# Patient Record
(demographics unavailable — no encounter records)

---

## 2024-12-30 NOTE — DISCUSSION/SUMMARY
[de-identified] : I explained to the patient that due to her extensive smoking history she is at increased risk for surgical complications (ie infection, wound healing problems and nonunion).  Surgery for her would not require simply "cutting off the bump" as she requested, but would necessitate possible osteotomies and/or arthrodesis.    Since she has not been to a primary care physician in a very long time as per her, this was strongly recommended. She also has significant weakness in both ankles. Neurology evaluation was also strongly recommended.  I explained that surgery could not be planned until she is seen by a pcp and a neurologist.  She will follow up at that time.

## 2024-12-30 NOTE — PHYSICAL EXAM
[1st] : 1st [3___] : eversion 3[unfilled]/5 [NL (20)] : eversion 20 degrees [5___] : [facility] 5[unfilled]/5 [Mild] : mild swelling of MTP joint/great toe [NL (40)] : plantar flexion 40 degrees [NL 30)] : inversion 30 degrees [4___] : eversion 4[unfilled]/5 [2+] : posterior tibialis pulse: 2+ [Normal] : saphenous nerve sensation normal [] : patient ambulates without assistive device [Right] : right foot [Left] : left foot [Weight -] : weightbearing [FreeTextEntry9] : Hypermobility of 1st ray. [FreeTextEntry3] : Extremely dirty plantar foot skin.  No ulcers or sign of infection. [de-identified] : Hallux valgus (IM14, HV40), erosive changes in 1st mtp head due to severe djd, achilles insertional/plantar calcaneal spurs, pes planus [de-identified] : eversion 15 degrees [TWNoteComboBox7] : dorsiflexion 15 degrees

## 2024-12-30 NOTE — PHYSICAL EXAM
[1st] : 1st [3___] : eversion 3[unfilled]/5 [NL (20)] : eversion 20 degrees [5___] : [facility] 5[unfilled]/5 [Mild] : mild swelling of MTP joint/great toe [NL (40)] : plantar flexion 40 degrees [NL 30)] : inversion 30 degrees [4___] : eversion 4[unfilled]/5 [2+] : posterior tibialis pulse: 2+ [Normal] : saphenous nerve sensation normal [] : patient ambulates without assistive device [Right] : right foot [Left] : left foot [Weight -] : weightbearing [FreeTextEntry9] : Hypermobility of 1st ray. [FreeTextEntry3] : Extremely dirty plantar foot skin.  No ulcers or sign of infection. [de-identified] : Hallux valgus (IM14, HV40), erosive changes in 1st mtp head due to severe djd, achilles insertional/plantar calcaneal spurs, pes planus [de-identified] : eversion 15 degrees [TWNoteComboBox7] : dorsiflexion 15 degrees

## 2024-12-30 NOTE — HISTORY OF PRESENT ILLNESS
[de-identified] : MARY CALDWELL a 58 year old female here for evaluation of her b/l feet. Patient states she has bunions on both her feet and would like to discuss surgery. She reports having pain for a year. She has tried shoe modifications.  Denies trauma/previous injury. WB in sneakers.   She is a one pack per day smoker for over 40 years.  She states she had prior bunion surgery but cannot remember which side.  When asked if she has any history of back issues she would not give a definitive answer. She does not see a primary care physician. [FreeTextEntry1] : b/l feet

## 2024-12-30 NOTE — HISTORY OF PRESENT ILLNESS
[de-identified] : MARY CALDWELL a 58 year old female here for evaluation of her b/l feet. Patient states she has bunions on both her feet and would like to discuss surgery. She reports having pain for a year. She has tried shoe modifications.  Denies trauma/previous injury. WB in sneakers.   She is a one pack per day smoker for over 40 years.  She states she had prior bunion surgery but cannot remember which side.  When asked if she has any history of back issues she would not give a definitive answer. She does not see a primary care physician. [FreeTextEntry1] : b/l feet

## 2024-12-30 NOTE — DISCUSSION/SUMMARY
[de-identified] : I explained to the patient that due to her extensive smoking history she is at increased risk for surgical complications (ie infection, wound healing problems and nonunion).  Surgery for her would not require simply "cutting off the bump" as she requested, but would necessitate possible osteotomies and/or arthrodesis.    Since she has not been to a primary care physician in a very long time as per her, this was strongly recommended. She also has significant weakness in both ankles. Neurology evaluation was also strongly recommended.  I explained that surgery could not be planned until she is seen by a pcp and a neurologist.  She will follow up at that time.